# Patient Record
Sex: FEMALE | Race: WHITE | NOT HISPANIC OR LATINO | Employment: OTHER | URBAN - METROPOLITAN AREA
[De-identification: names, ages, dates, MRNs, and addresses within clinical notes are randomized per-mention and may not be internally consistent; named-entity substitution may affect disease eponyms.]

---

## 2018-08-16 ENCOUNTER — APPOINTMENT (OUTPATIENT)
Dept: GENERAL RADIOLOGY | Facility: CLINIC | Age: 68
End: 2018-08-16
Attending: EMERGENCY MEDICINE
Payer: MEDICARE

## 2018-08-16 ENCOUNTER — HOSPITAL ENCOUNTER (EMERGENCY)
Facility: CLINIC | Age: 68
Discharge: HOME | End: 2018-08-16
Attending: EMERGENCY MEDICINE | Admitting: EMERGENCY MEDICINE
Payer: MEDICARE

## 2018-08-16 VITALS
TEMPERATURE: 97 F | SYSTOLIC BLOOD PRESSURE: 156 MMHG | OXYGEN SATURATION: 97 % | DIASTOLIC BLOOD PRESSURE: 83 MMHG | RESPIRATION RATE: 25 BRPM

## 2018-08-16 DIAGNOSIS — R42 DIZZINESS: ICD-10-CM

## 2018-08-16 DIAGNOSIS — R07.89 ATYPICAL CHEST PAIN: ICD-10-CM

## 2018-08-16 LAB
ALBUMIN SERPL-MCNC: 3.6 G/DL (ref 3.4–5)
ALP SERPL-CCNC: 51 U/L (ref 40–150)
ALT SERPL W P-5'-P-CCNC: 24 U/L (ref 0–50)
ANION GAP SERPL CALCULATED.3IONS-SCNC: 7 MMOL/L (ref 3–14)
AST SERPL W P-5'-P-CCNC: 18 U/L (ref 0–45)
BASOPHILS # BLD AUTO: 0.1 10E9/L (ref 0–0.2)
BASOPHILS NFR BLD AUTO: 0.9 %
BILIRUB SERPL-MCNC: 0.4 MG/DL (ref 0.2–1.3)
BUN SERPL-MCNC: 15 MG/DL (ref 7–30)
CALCIUM SERPL-MCNC: 8.6 MG/DL (ref 8.5–10.1)
CHLORIDE SERPL-SCNC: 111 MMOL/L (ref 94–109)
CO2 SERPL-SCNC: 26 MMOL/L (ref 20–32)
CREAT SERPL-MCNC: 0.67 MG/DL (ref 0.52–1.04)
D DIMER PPP FEU-MCNC: 0.5 UG/ML FEU (ref 0–0.5)
DIFFERENTIAL METHOD BLD: NORMAL
EOSINOPHIL # BLD AUTO: 0.1 10E9/L (ref 0–0.7)
EOSINOPHIL NFR BLD AUTO: 1.8 %
ERYTHROCYTE [DISTWIDTH] IN BLOOD BY AUTOMATED COUNT: 12.7 % (ref 10–15)
GFR SERPL CREATININE-BSD FRML MDRD: 87 ML/MIN/1.7M2
GLUCOSE SERPL-MCNC: 100 MG/DL (ref 70–99)
HCT VFR BLD AUTO: 39.9 % (ref 35–47)
HGB BLD-MCNC: 12.9 G/DL (ref 11.7–15.7)
IMM GRANULOCYTES # BLD: 0 10E9/L (ref 0–0.4)
IMM GRANULOCYTES NFR BLD: 0.2 %
INTERPRETATION ECG - MUSE: NORMAL
LIPASE SERPL-CCNC: 108 U/L (ref 73–393)
LYMPHOCYTES # BLD AUTO: 2.3 10E9/L (ref 0.8–5.3)
LYMPHOCYTES NFR BLD AUTO: 35.6 %
MCH RBC QN AUTO: 29.4 PG (ref 26.5–33)
MCHC RBC AUTO-ENTMCNC: 32.3 G/DL (ref 31.5–36.5)
MCV RBC AUTO: 91 FL (ref 78–100)
MONOCYTES # BLD AUTO: 0.5 10E9/L (ref 0–1.3)
MONOCYTES NFR BLD AUTO: 7.8 %
NEUTROPHILS # BLD AUTO: 3.5 10E9/L (ref 1.6–8.3)
NEUTROPHILS NFR BLD AUTO: 53.7 %
NRBC # BLD AUTO: 0 10*3/UL
NRBC BLD AUTO-RTO: 0 /100
PLATELET # BLD AUTO: 234 10E9/L (ref 150–450)
POTASSIUM SERPL-SCNC: 3.6 MMOL/L (ref 3.4–5.3)
PROT SERPL-MCNC: 6.6 G/DL (ref 6.8–8.8)
RBC # BLD AUTO: 4.39 10E12/L (ref 3.8–5.2)
SODIUM SERPL-SCNC: 144 MMOL/L (ref 133–144)
TROPONIN I SERPL-MCNC: <0.015 UG/L (ref 0–0.04)
WBC # BLD AUTO: 6.6 10E9/L (ref 4–11)

## 2018-08-16 PROCEDURE — 84484 ASSAY OF TROPONIN QUANT: CPT | Performed by: EMERGENCY MEDICINE

## 2018-08-16 PROCEDURE — 99285 EMERGENCY DEPT VISIT HI MDM: CPT | Mod: 25

## 2018-08-16 PROCEDURE — 25000128 H RX IP 250 OP 636: Performed by: EMERGENCY MEDICINE

## 2018-08-16 PROCEDURE — 96361 HYDRATE IV INFUSION ADD-ON: CPT

## 2018-08-16 PROCEDURE — 93005 ELECTROCARDIOGRAM TRACING: CPT

## 2018-08-16 PROCEDURE — 96360 HYDRATION IV INFUSION INIT: CPT

## 2018-08-16 PROCEDURE — 80053 COMPREHEN METABOLIC PANEL: CPT | Performed by: EMERGENCY MEDICINE

## 2018-08-16 PROCEDURE — 25000131 ZZH RX MED GY IP 250 OP 636 PS 637: Mod: GY | Performed by: EMERGENCY MEDICINE

## 2018-08-16 PROCEDURE — A9270 NON-COVERED ITEM OR SERVICE: HCPCS | Mod: GY | Performed by: EMERGENCY MEDICINE

## 2018-08-16 PROCEDURE — 85379 FIBRIN DEGRADATION QUANT: CPT | Performed by: EMERGENCY MEDICINE

## 2018-08-16 PROCEDURE — 71046 X-RAY EXAM CHEST 2 VIEWS: CPT

## 2018-08-16 PROCEDURE — 83690 ASSAY OF LIPASE: CPT | Performed by: EMERGENCY MEDICINE

## 2018-08-16 PROCEDURE — 85025 COMPLETE CBC W/AUTO DIFF WBC: CPT | Performed by: EMERGENCY MEDICINE

## 2018-08-16 RX ORDER — MECLIZINE HYDROCHLORIDE 25 MG/1
25 TABLET ORAL ONCE
Status: COMPLETED | OUTPATIENT
Start: 2018-08-16 | End: 2018-08-16

## 2018-08-16 RX ADMIN — SODIUM CHLORIDE, POTASSIUM CHLORIDE, SODIUM LACTATE AND CALCIUM CHLORIDE 500 ML: 600; 310; 30; 20 INJECTION, SOLUTION INTRAVENOUS at 07:38

## 2018-08-16 RX ADMIN — MECLIZINE HYDROCHLORIDE 25 MG: 25 TABLET ORAL at 07:38

## 2018-08-16 ASSESSMENT — ENCOUNTER SYMPTOMS
FEVER: 0
CHEST TIGHTNESS: 1
VOMITING: 0
COLOR CHANGE: 0
COUGH: 0
NAUSEA: 1
CONSTIPATION: 1

## 2018-08-16 NOTE — ED AVS SNAPSHOT
[facility] Emergency Department    [address]    [facility] [address]    Phone:  [phone]    Fax:  [phone]                                       Mariajose Shepard   MRN: [id number]    Department:  [facility] Emergency Department   Date of Visit:  8/16/2018           Patient Information     Date Of Birth          1950        Your diagnoses for this visit were:     Atypical chest pain     Dizziness        You were seen by Bc Foster MD.        Discharge Instructions       Please make an appointment to follow up with your primary care provider in 1-2 days for a recheck.    Discharge Instructions  Chest Pain    You have been seen today for chest pain or discomfort.  At this time, your provider has found no signs that your chest pain is due to a serious or life-threatening condition, (or you have declined more testing and/or admission to the hospital). However, sometimes there is a serious problem that does not show up right away. Your evaluation today may not be complete and you may need further testing and evaluation.     Generally, every Emergency Department visit should have a follow-up clinic visit with either a primary or a specialty clinic/provider. Please follow-up as instructed by your emergency provider today.  Return to the Emergency Department if:    Your chest pain changes, gets worse, starts to happen more often, or comes with less activity.    You are newly short of breath.    You get very weak or tired.    You pass out or faint.    You have any new symptoms, like fever, cough, numb legs, or you cough up blood.    You have anything else that worries you.    Until you follow-up with your regular provider, please do the following:    Take one aspirin daily unless you have an allergy or are told not to by your provider.    If a stress test appointment has been made, go to the appointment.    If you have questions, contact your regular  provider.    Follow-up with your regular provider/clinic as directed; this is very important.    If you were given a prescription for medicine here today, be sure to read all of the information (including the package insert) that comes with your prescription.  This will include important information about the medicine, its side effects, and any warnings that you need to know about.  The pharmacist who fills the prescription can provide more information and answer questions you may have about the medicine.  If you have questions or concerns that the pharmacist cannot address, please call or return to the Emergency Department.       Remember that you can always come back to the Emergency Department if you are not able to see your regular provider in the amount of time listed above, if you get any new symptoms, or if there is anything that worries you.        Dizziness   Dizziness is a common symptom. It may be described as lightheadedness, spinning, or feeling like you are going to faint. Dizziness can have many causes.  Be sure to tell the healthcare provider about:    All medicines you take, including prescription, over-the-counter, herbs, and supplements    Any other symptoms you have    Any health problems you are being treated for    Any past major health problems you've had, such as a heart attack, balance issues, hearing problems, or blood pressure problems    Anything that causes the dizziness to get worse or better  Today's exam did not show an exact cause for your dizziness. Other tests may be needed. Follow up with your healthcare provider.  Home care    Dizziness that occurs with sudden standing may be a sign of mild dehydration. Drink extra fluids for the next few days.    If you recently started a new medicine, stopped a medicine, or had the dose of a current medicine changed, talk with the prescribing healthcare provider. Your medicine plan may need adjustment.    If dizziness lasts more than a few  seconds, sit or lie down until it passes. This may help prevent injury in case you pass out. Get up slowly when you feel better.    Don't drive or use power tools or dangerous equipment until you have had no dizziness for at least 48 hours.  Follow-up care  Follow up with your healthcare provider for further evaluation within the next 7 days or as advised.  When to seek medical advice  Call your healthcare provider for any of the following:    Worsening of symptoms or new symptoms    Passing out or seizure    Repeated vomiting    Headache    Palpitations (the sense that your heart is fluttering or beating fast or hard)    Shortness of breath    Blood in vomit or stool (black or red color)    Weakness of an arm or leg or 1 side of the face    Vision or hearing changes    Trouble walking or speaking    Chest, arm, neck, back, or jaw pain  Date Last Reviewed: 11/1/2017 2000-2017 The "[employer]. [address] [place] [address]. All rights reserved. This information is not intended as a substitute for professional medical care. Always follow your healthcare professional's instructions.            24 Hour Appointment Hotline       To make an appointment at any [facility], call [phone] ([phone]). If you don't have a family doctor or clinic, we will help you find one. [facility] clinics are conveniently located to serve the needs of you and your family.             Review of your medicines      Our records show that you are taking the medicines listed below. If these are incorrect, please call your family doctor or clinic.        Dose / Directions Last dose taken    CALCIUM CITRATE PO        None Entered   Refills:  0        FLUTICASONE PROPIONATE (NASAL) 50 MCG/ACT Susp        None Entered   Refills:  0        SINGULAIR PO        1 TABLET EVERY EVENING   Refills:  0        VALTREX 1000 mg tablet   Quantity:  21   Generic drug:  valACYclovir        1 TABLET 3 TIMES DAILY   Refills:   0                Procedures and tests performed during your visit     CBC with platelets differential    Comprehensive metabolic panel    D dimer quantitative    Lipase    Troponin I    XR Chest 2 Views      Orders Needing Specimen Collection     None      Pending Results     No orders found from 8/14/2018 to 8/17/2018.            Pending Culture Results     No orders found from 8/14/2018 to 8/17/2018.            Pending Results Instructions     If you had any lab results that were not finalized at the time of your Discharge, you can call the ED Lab Result RN at [phone]. You will be contacted by this team for any positive Lab results or changes in treatment. The nurses are available 7 days a week from 10A to 6:30P.  You can leave a message 24 hours per day and they will return your call.        Test Results From Your Hospital Stay        8/16/2018  7:41 AM      Component Results     Component Value Ref Range & Units Status    WBC 6.6 4.0 - 11.0 10e9/L Final    RBC Count 4.39 3.8 - 5.2 10e12/L Final    Hemoglobin 12.9 11.7 - 15.7 g/dL Final    Hematocrit 39.9 35.0 - 47.0 % Final    MCV 91 78 - 100 fl Final    MCH 29.4 26.5 - 33.0 pg Final    MCHC 32.3 31.5 - 36.5 g/dL Final    RDW 12.7 10.0 - 15.0 % Final    Platelet Count 234 150 - 450 10e9/L Final    Diff Method Automated Method  Final    % Neutrophils 53.7 % Final    % Lymphocytes 35.6 % Final    % Monocytes 7.8 % Final    % Eosinophils 1.8 % Final    % Basophils 0.9 % Final    % Immature Granulocytes 0.2 % Final    Nucleated RBCs 0 0 /100 Final    Absolute Neutrophil 3.5 1.6 - 8.3 10e9/L Final    Absolute Lymphocytes 2.3 0.8 - 5.3 10e9/L Final    Absolute Monocytes 0.5 0.0 - 1.3 10e9/L Final    Absolute Eosinophils 0.1 0.0 - 0.7 10e9/L Final    Absolute Basophils 0.1 0.0 - 0.2 10e9/L Final    Abs Immature Granulocytes 0.0 0 - 0.4 10e9/L Final    Absolute Nucleated RBC 0.0  Final         8/16/2018  8:35 AM      Component Results     Component Value Ref Range  & Units Status    D Dimer 0.5 0.0 - 0.50 ug/ml FEU Final    This D-dimer assay is intended for use in conjunction with a clinical pretest   probability assessment model to exclude pulmonary embolism (PE) and deep   venous thrombosis (DVT) in outpatients suspected of PE or DVT. The cut-off   value is 0.5 ug/mL FEU.           8/16/2018  8:03 AM      Component Results     Component Value Ref Range & Units Status    Sodium 144 133 - 144 mmol/L Final    Potassium 3.6 3.4 - 5.3 mmol/L Final    Chloride 111 (H) 94 - 109 mmol/L Final    Carbon Dioxide 26 20 - 32 mmol/L Final    Anion Gap 7 3 - 14 mmol/L Final    Glucose 100 (H) 70 - 99 mg/dL Final    Urea Nitrogen 15 7 - 30 mg/dL Final    Creatinine 0.67 0.52 - 1.04 mg/dL Final    GFR Estimate 87 >60 mL/min/1.7m2 Final    Non  GFR Calc    GFR Estimate If Black >90 >60 mL/min/1.7m2 Final    African American GFR Calc    Calcium 8.6 8.5 - 10.1 mg/dL Final    Bilirubin Total 0.4 0.2 - 1.3 mg/dL Final    Albumin 3.6 3.4 - 5.0 g/dL Final    Protein Total 6.6 (L) 6.8 - 8.8 g/dL Final    Alkaline Phosphatase 51 40 - 150 U/L Final    ALT 24 0 - 50 U/L Final    AST 18 0 - 45 U/L Final         8/16/2018  8:03 AM      Component Results     Component Value Ref Range & Units Status    Lipase 108 73 - 393 U/L Final         8/16/2018  8:03 AM      Component Results     Component Value Ref Range & Units Status    Troponin I ES <0.015 0.000 - 0.045 ug/L Final    The 99th percentile for upper reference range is 0.045 ug/L.  Troponin values   in the range of 0.045 - 0.120 ug/L may be associated with risks of adverse   clinical events.           8/16/2018  9:07 AM      Narrative     XR CHEST 2 VW 8/16/2018 9:00 AM    HISTORY: Chest pain.    COMPARISON: None.    FINDINGS: No airspace consolidation, pleural effusion or pneumothorax.  Normal heart size.        Impression     IMPRESSION: No acute cardiopulmonary abnormality.    MELITA SALINAS MD                Clinical Quality  Measure: Blood Pressure Screening     Your blood pressure was checked while you were in the emergency department today. The last reading we obtained was  BP: 156/83 . Please read the guidelines below about what these numbers mean and what you should do about them.  If your systolic blood pressure (the top number) is less than 120 and your diastolic blood pressure (the bottom number) is less than 80, then your blood pressure is normal. There is nothing more that you need to do about it.  If your systolic blood pressure (the top number) is 120-139 or your diastolic blood pressure (the bottom number) is 80-89, your blood pressure may be higher than it should be. You should have your blood pressure rechecked within a year by a primary care provider.  If your systolic blood pressure (the top number) is 140 or greater or your diastolic blood pressure (the bottom number) is 90 or greater, you may have high blood pressure. High blood pressure is treatable, but if left untreated over time it can put you at risk for heart attack, stroke, or kidney failure. You should have your blood pressure rechecked by a primary care provider within the next 4 weeks.  If your provider in the emergency department today gave you specific instructions to follow-up with your doctor or provider even sooner than that, you should follow that instruction and not wait for up to 4 weeks for your follow-up visit.        Thank you for choosing [facility]       Thank you for choosing [facility] for your care. Our goal is always to provide you with excellent care. Hearing back from our patients is one way we can continue to improve our services. Please take a few minutes to complete the written survey that you may receive in the mail after you visit with us. Thank you!        [employer] Information     [employer] lets you send messages to your doctor, view your test results, renew your prescriptions, schedule appointments and more. To sign up, go to  [email or web address] . Click on \"Log in\" on the left side of the screen, which will take you to the Welcome page. Then click on \"Sign up Now\" on the right side of the page.     You will be asked to enter the access code listed below, as well as some personal information. Please follow the directions to create your username and password.     Your access code is: [email or web address]  Expires: 2018  9:13 AM     Your access code will  in 90 days. If you need help or a new code, please call your [facility] clinic or [phone].        Care EveryWhere ID     This is your Care EveryWhere ID. This could be used by other organizations to access your [facility] medical records  [id number]        Equal Access to Services     FAITH RAINEY : Celina Clarke, tiffany hutson, alan mclain, jaydon ramos. So [facility] [phone].    ATENCIÓN: Si habla español, tiene a dupont disposición servicios gratuitos de asistencia lingüística. Llame al [phone].    We comply with applicable federal civil rights laws and [place] laws. We do not discriminate on the basis of race, color, national origin, age, disability, sex, sexual orientation, or gender identity.            After Visit Summary       This is your record. Keep this with you and show to your community pharmacist(s) and doctor(s) at your next visit.                  "

## 2018-08-16 NOTE — ED TRIAGE NOTES
"Patient presents to ED due to multiple c/o states pain to R side of chest describing it as a \"band like senation radiating to R breast to back{\" since yesterday    Woke up this morning and sat up felt dizzy, nauseated    Denies SOB     Reports taking to 81mg ( x2) ASA     Given 4mg zofran en route     "

## 2018-08-16 NOTE — DISCHARGE INSTRUCTIONS
Please make an appointment to follow up with your primary care provider in 1-2 days for a recheck.    Discharge Instructions  Chest Pain    You have been seen today for chest pain or discomfort.  At this time, your provider has found no signs that your chest pain is due to a serious or life-threatening condition, (or you have declined more testing and/or admission to the hospital). However, sometimes there is a serious problem that does not show up right away. Your evaluation today may not be complete and you may need further testing and evaluation.     Generally, every Emergency Department visit should have a follow-up clinic visit with either a primary or a specialty clinic/provider. Please follow-up as instructed by your emergency provider today.  Return to the Emergency Department if:    Your chest pain changes, gets worse, starts to happen more often, or comes with less activity.    You are newly short of breath.    You get very weak or tired.    You pass out or faint.    You have any new symptoms, like fever, cough, numb legs, or you cough up blood.    You have anything else that worries you.    Until you follow-up with your regular provider, please do the following:    Take one aspirin daily unless you have an allergy or are told not to by your provider.    If a stress test appointment has been made, go to the appointment.    If you have questions, contact your regular provider.    Follow-up with your regular provider/clinic as directed; this is very important.    If you were given a prescription for medicine here today, be sure to read all of the information (including the package insert) that comes with your prescription.  This will include important information about the medicine, its side effects, and any warnings that you need to know about.  The pharmacist who fills the prescription can provide more information and answer questions you may have about the medicine.  If you have questions or concerns that  the pharmacist cannot address, please call or return to the Emergency Department.       Remember that you can always come back to the Emergency Department if you are not able to see your regular provider in the amount of time listed above, if you get any new symptoms, or if there is anything that worries you.        Dizziness   Dizziness is a common symptom. It may be described as lightheadedness, spinning, or feeling like you are going to faint. Dizziness can have many causes.  Be sure to tell the healthcare provider about:    All medicines you take, including prescription, over-the-counter, herbs, and supplements    Any other symptoms you have    Any health problems you are being treated for    Any past major health problems you've had, such as a heart attack, balance issues, hearing problems, or blood pressure problems    Anything that causes the dizziness to get worse or better  Today's exam did not show an exact cause for your dizziness. Other tests may be needed. Follow up with your healthcare provider.  Home care    Dizziness that occurs with sudden standing may be a sign of mild dehydration. Drink extra fluids for the next few days.    If you recently started a new medicine, stopped a medicine, or had the dose of a current medicine changed, talk with the prescribing healthcare provider. Your medicine plan may need adjustment.    If dizziness lasts more than a few seconds, sit or lie down until it passes. This may help prevent injury in case you pass out. Get up slowly when you feel better.    Don't drive or use power tools or dangerous equipment until you have had no dizziness for at least 48 hours.  Follow-up care  Follow up with your healthcare provider for further evaluation within the next 7 days or as advised.  When to seek medical advice  Call your healthcare provider for any of the following:    Worsening of symptoms or new symptoms    Passing out or seizure    Repeated  vomiting    Headache    Palpitations (the sense that your heart is fluttering or beating fast or hard)    Shortness of breath    Blood in vomit or stool (black or red color)    Weakness of an arm or leg or 1 side of the face    Vision or hearing changes    Trouble walking or speaking    Chest, arm, neck, back, or jaw pain  Date Last Reviewed: 11/1/2017 2000-2017 [employer]. [address] [place] [address]. All rights reserved. This information is not intended as a substitute for professional medical care. Always follow your healthcare professional's instructions.

## 2018-08-16 NOTE — ED AVS SNAPSHOT
[facility] Emergency Department    [address]    [facility] [address]    Phone:  [phone]    Fax:  [phone]                                       Mariajose Shepard   MRN: [id number]    Department:  [facility] Emergency Department   Date of Visit:  8/16/2018           After Visit Summary Signature Page     I have received my discharge instructions, and my questions have been answered. I have discussed any challenges I see with this plan with the nurse or doctor.    ..........................................................................................................................................  Patient/Patient Representative Signature      ..........................................................................................................................................  Patient Representative Print Name and Relationship to Patient    ..................................................               ................................................  Date                                            Time    ..........................................................................................................................................  Reviewed by Signature/Title    ...................................................              ..............................................  Date                                                            Time

## 2018-08-16 NOTE — ED PROVIDER NOTES
"  History     Chief Complaint:  Chest Pain      HPI   Mariajose Shepard is a 68 year old female, with a history of vertigo, osteopenia, and heart murmer, who presents with chest pain. Patient states yesterday she spent most of the day working on bathroom revenations which involved a lot of running up. She reported when she went to bed she was feeling exhausted. This morning she woke up with chest pressure that radiates all the way across her chest and to her back. She notes this pressure has been constant and on going for a few days now. She describes it as a \"dull toothache pain\" with itching and numbness. She notes no rashes or skin changes aside from a new mole. Besides the chest pain, she also stated experiencing nausea, lightheadedness and dizziness, all symptoms she associated with her past vertigo. Patient denies any shortness of breath, fever, cough, nauea, or changes with urination and bowel movement. She notes having constipation issues which are currently being treated for her recent physical therapy.           Allergies:  No Known Drug Allergies     Medications:    Calcium citrate  Fluticasone propionate  Singulair  Valtrex      Past Medical History:    Dilatation of aortic sinus of Valsalva   Osteopenia   Heart murmur   Vertigo     Past Surgical History:    Tonsillectomy     Family History:    The patient denies any relevant family medical history.    Social History:  The patient was accompanied to the ED by  and son.  Smoking Status: No  Smokeless Tobacco: Not on file  Alcohol Use: No  Marital Status:   [2]    Review of Systems   Constitutional: Negative for fever.   Respiratory: Positive for chest tightness. Negative for cough.    Cardiovascular: Positive for chest pain.   Gastrointestinal: Positive for constipation and nausea. Negative for vomiting.   Skin: Negative for color change and rash.   All other systems reviewed and are negative.    Physical Exam   Vitals:  Patient Vitals for the " past 24 hrs:   BP Temp Temp src Heart Rate Resp SpO2   08/16/18 0830 156/83 - - 54 13 96 %   08/16/18 0815 159/77 - - 58 19 98 %   08/16/18 0800 153/77 - - 49 11 97 %   08/16/18 0745 153/79 - - 52 18 95 %   08/16/18 0730 160/76 - - 45 8 99 %   08/16/18 0715 159/82 - - 70 (!) 33 99 %   08/16/18 0700 154/84 - - 46 20 95 %   08/16/18 0646 - 97  F (36.1  C) Temporal - - -   08/16/18 0641 160/87 - - 47 20 98 %         Physical Exam  Gen: NAD  HEENT:  mmm, PERRL, no rhinorrhea  Neck: supple, no abnormal swelling or tenderness  Lungs:  CTAB,  no resp distress  CV: rrr, no m/r/g, ppi  Abd: soft, nontender, negative RUQ tenderness and negative murphys sign, nondistended, no rebound/masses/guarding/hsm  Ext: no peripheral edema  Skin: warm, dry, well perfused, no rashes/bruising/lesions on exposed skin  Neuro: alert, MAEE, no gross motor or sensory deficits, gait stable  Psych: Normal mood, normal affect     Emergency Department Course     ECG:  ECG taken at 0645, ECG read at 0704  Sinus bradycardia  Otherwise normal ECG  Rate 48 bpm. TX interval 188. QRS duration 82. QT/QTc 488/435. P-R-T axes 37, 19, 32.    Imaging:  Radiology findings were communicated with the patient who voiced understanding of the findings.  XR Chest   IMPRESSION: No acute cardiopulmonary abnormality.  Reading per radiology.    Laboratory:  Laboratory findings were communicated with the patient who voiced understanding of the findings.  CBC: AWNL (WBC 6.6, HGB 12.9, )  D Dimer quantitative: 0.5  CMP: Chloride 111 (H), Glucose 100 (H), Protein 6.6 (L) O/W WNL (Creatinine 0.67)  Lipase: 108  Troponin (Collected 0803): <0.015    Interventions:  0738 Antivert 25 mg Oral  0738 Lactated ringers BOLUS 500 mLs IV     Emergency Department Course:  Nursing notes and vitals reviewed.  I performed an exam of the patient as documented above.   IV was inserted and blood was drawn for laboratory testing, results above.  The patient was sent for a Chest XR while  in the emergency department, results above.     I personally reviewed the laboratory results with the patient and answered all related questions prior to discharge.    Findings and plan explained to the patient. Patient discharged home with instructions regarding supportive care, medications, and reasons to return. The importance of close follow-up was reviewed.     Impression & Plan      Medical Decision Making:  Mariajose Shepard is a 68-year-old female 1-2 days of right-sided chest pain.  She has also had some intermittent dizziness/vertigo.  Her neurologic examination is unremarkable here there is no signs of shingles, chest wall tenderness, right upper quadrant abdominal tenderness.  The vertigo certainly sounds positional and peripheral by her description.  I do not think this is of central etiology and she does not need advanced imaging of her brain.  The chest discomfort may be musculoskeletal or GI related low suspicion that it is hepatobiliary based on exam and story.  Early shingles would be a possibility without interruption occurring on the skin yet.  To be atypical for ACS but will do EKG troponin and d-dimer for PE or stratification.  Unlikely an aortic catastrophe spontaneous pneumothorax or pneumomediastinum.  Currently asymptomatic on ED arrival.     Update on Mariajose Shepard. She remained asymptomatic here, her troponin was undetectable and given the duration of her symptoms constant nature, nonexertional pattern this puts her in a very low risk category of it being an exclusive coronary process/ACS. D dimer not elevated. Chest XR unremarkable. Other basic blood tests unremarkable including lipase and a liver panel. And again her right upper quadrant abdominal examination showed no tenderness suggesting this is maryan related. I think at this point she can be safely discharged and have close follow up with her PCP to discuss proactive  testing. Her and her family are comfortable and agree with this plan.  They understand what is known and unknown at this time based on the tests available and don't need revaluation with her symptoms and her follow up plan.     Diagnosis:    ICD-10-CM    1. Atypical chest pain R07.89    2. Dizziness R42        Disposition:   Discharged    CMS Diagnoses: None     Scribe Disclosure:  Fawn DIAMOND, am serving as a scribe at 7:10 AM on 8/16/2018 to document services personally performed by Bc Foster, *, based on my observations and the provider's statements to me.  [facility] EMERGENCY DEPARTMENT       Bc Foster MD  08/16/18 1721

## 2019-08-06 ENCOUNTER — HOSPITAL ENCOUNTER (OUTPATIENT)
Dept: ULTRASOUND IMAGING | Facility: CLINIC | Age: 69
Discharge: HOME | End: 2019-08-06
Attending: PHYSICIAN ASSISTANT | Admitting: PHYSICIAN ASSISTANT
Payer: MEDICARE

## 2019-08-06 DIAGNOSIS — R92.8 ABNORMAL MAMMOGRAM: ICD-10-CM

## 2019-08-06 PROCEDURE — 76642 ULTRASOUND BREAST LIMITED: CPT | Mod: LT

## 2024-02-15 ENCOUNTER — HOSPITAL ENCOUNTER (OUTPATIENT)
Dept: MAMMOGRAPHY | Facility: CLINIC | Age: 74
Discharge: HOME | End: 2024-02-15
Payer: COMMERCIAL

## 2024-02-15 ENCOUNTER — MEDICAL CORRESPONDENCE (OUTPATIENT)
Dept: SCHEDULING | Facility: CLINIC | Age: 74
End: 2024-02-15

## 2024-02-15 ENCOUNTER — ANCILLARY PROCEDURE (OUTPATIENT)
Dept: GENERAL RADIOLOGY | Facility: CLINIC | Age: 74
End: 2024-02-15
Payer: COMMERCIAL

## 2024-02-15 DIAGNOSIS — M25.519 SHOULDER PAIN: ICD-10-CM

## 2024-02-15 DIAGNOSIS — Z12.31 VISIT FOR SCREENING MAMMOGRAM: ICD-10-CM

## 2024-02-15 PROCEDURE — 77063 BREAST TOMOSYNTHESIS BI: CPT

## 2024-02-15 PROCEDURE — 73030 X-RAY EXAM OF SHOULDER: CPT | Mod: TC | Performed by: RADIOLOGY

## 2024-03-28 ENCOUNTER — ANCILLARY PROCEDURE (OUTPATIENT)
Dept: BONE DENSITY | Facility: CLINIC | Age: 74
End: 2024-03-28
Payer: COMMERCIAL

## 2024-03-28 DIAGNOSIS — Z13.820 OSTEOPOROSIS SCREENING: ICD-10-CM

## 2024-03-28 DIAGNOSIS — E28.39 ESTROGEN DEFICIENCY: ICD-10-CM

## 2024-03-28 DIAGNOSIS — M85.80 OSTEOPENIA: ICD-10-CM

## 2024-03-28 PROCEDURE — 77080 DXA BONE DENSITY AXIAL: CPT | Mod: TC | Performed by: PHYSICIAN ASSISTANT

## 2024-06-22 ENCOUNTER — HOSPITAL ENCOUNTER (EMERGENCY)
Facility: CLINIC | Age: 74
Discharge: HOME | End: 2024-06-22
Attending: EMERGENCY MEDICINE | Admitting: EMERGENCY MEDICINE
Payer: COMMERCIAL

## 2024-06-22 ENCOUNTER — APPOINTMENT (OUTPATIENT)
Dept: ULTRASOUND IMAGING | Facility: CLINIC | Age: 74
End: 2024-06-22
Attending: EMERGENCY MEDICINE
Payer: COMMERCIAL

## 2024-06-22 VITALS
OXYGEN SATURATION: 98 % | HEIGHT: 65 IN | HEART RATE: 60 BPM | TEMPERATURE: 98.2 F | WEIGHT: 126.1 LBS | SYSTOLIC BLOOD PRESSURE: 157 MMHG | BODY MASS INDEX: 21.01 KG/M2 | RESPIRATION RATE: 20 BRPM | DIASTOLIC BLOOD PRESSURE: 83 MMHG

## 2024-06-22 DIAGNOSIS — R22.41 LOCALIZED SWELLING OF RIGHT FOOT: ICD-10-CM

## 2024-06-22 PROCEDURE — 99284 EMERGENCY DEPT VISIT MOD MDM: CPT | Mod: 25

## 2024-06-22 PROCEDURE — 93971 EXTREMITY STUDY: CPT | Mod: RT

## 2024-06-22 ASSESSMENT — COLUMBIA-SUICIDE SEVERITY RATING SCALE - C-SSRS
2. HAVE YOU ACTUALLY HAD ANY THOUGHTS OF KILLING YOURSELF IN THE PAST MONTH?: NO
1. IN THE PAST MONTH, HAVE YOU WISHED YOU WERE DEAD OR WISHED YOU COULD GO TO SLEEP AND NOT WAKE UP?: NO
6. HAVE YOU EVER DONE ANYTHING, STARTED TO DO ANYTHING, OR PREPARED TO DO ANYTHING TO END YOUR LIFE?: NO

## 2024-06-22 ASSESSMENT — ACTIVITIES OF DAILY LIVING (ADL)
ADLS_ACUITY_SCORE: 35
ADLS_ACUITY_SCORE: 35
ADLS_ACUITY_SCORE: 33

## 2024-06-22 NOTE — DISCHARGE INSTRUCTIONS
You do not have a blood clot in the leg.  Please continue taking your antibiotics and wrapping your foot.  Elevate when possible.  If you are not noticing any improvement in your symptoms by the end of this coming week, please contact your primary care provider to set up a follow-up appointment.  Please return the emergency department if you develop any new or concerning symptoms.

## 2024-06-22 NOTE — ED PROVIDER NOTES
"  Emergency Department Note      History of Present Illness     Chief Complaint  Abnormal Labs      HPI  Mariajose Shepard is a 74 year old female with a history of a heart murmur, hypertension, and osteopenia, who presents to the ED for abnormal labs. On Tuesday, 6/11/24, the patient woke up with a soreness on the top of her R foot. This morning, she woke up and felt that her R foot was throbbing, and a dull pain had radiated to her R calf and thigh. Resting makes the pain better, such as when she wakes up in the morning, and movement worsens the pain. She states her foot is also slightly numb, but thinks that is from having it wrapped. She went to Urgent Care today, and when blood work showed she had a D-Dimer of 1.91, they referred her to the ED. She was diagnosed with cellulitis, and given antibiotics. The patient denies any shortness of breath, chest pain, or fever. She has no known recent traumas to the R foot or leg. Of note, the patient states that she is always cold due to being anemic.     Independent Historian  Son present at bedside and corroborates the above.     Review of External Notes  I reviewed the Urgent Care note from today, 6/22/24.   Past Medical History   Medical History and Problem List  Anemia   Osteopenia   Heart murmur   Arthritis  Hypertension     Medications  Valtrex  Losartan  Cefadroxil  Alendronate   Meclizine     Surgical History   Tonsillectomy  EGD  Colonoscopy     Physical Exam   Patient Vitals for the past 24 hrs:   BP Temp Temp src Pulse Resp SpO2 Height Weight   06/22/24 1847 (!) 157/83 -- -- 60 20 98 % -- --   06/22/24 1655 (!) 164/83 98.2  F (36.8  C) Oral 66 20 99 % 1.651 m (5' 5\") 57.2 kg (126 lb 1.7 oz)     Physical Exam  General: No acute distress.  Head: No obvious trauma to head.  Eyes:  Conjunctivae clear.   Neck: Normal range of motion.   CV: Skin is well perfused, no cyanosis  Respiratory: Effort normal. No audible wheezing.  Gastrointestinal: " Patient Name:  Ruth Dominguez   YOB: 1984   MRN:  [id number]     Group Topic:  BH Group Psychotherapy  Group Date:  2/8/2022  Start Time:   6:00 PM  End Time:   8:00 PM  Facilitators:  Maury Cannon PSYD   Department::  [facility]        [facility]      Date: 02/08/22    Off-site:  No    Activities: Review of Homework from Week 2, review of Concepts from Week 2, presentation of Topics in Week 3    Data and Progress toward Goal(s): This is the third of seven meetings in this module, with 3 parents (representing 3 children) attending the Parenting Skills Group.  The topics covered in this evening's group included: Reviewing use of effective attending skills to increase immediate compliance; assisting parents in increasing the use of effective attending skills for independent, non-disruptive compliance; and increasing parental monitoring of children's appropriate behavior.      Plan:  Continue to support patient's efforts and progress towards establishing treatment plan goals in the following ways: Check in with parent regarding compliance with this week's homework and application of new concepts.  The next group meeting will be next Tuesday at 6pm.       This visit is being performed virtually.  Clinician Location: Office.   's Location: Home   120 minutes were spent with the patient in a video encounter.        Patient’s response to today’s intervention:  Patient was engaged and cooperative during group session. She seemed to struggle how to put into practice effective commands, often providing examples of requests as questions. Was receptive to recommendations given.     The encounter diagnosis was Generalized anxiety disorder.       Non-distended.  Musculoskeletal: Normal range of motion. No gross deformities. No lower extremity edema. DP and PT 2+ bilaterally. Capillary refill in the toes are less than 2 seconds.   Neuro: Alert. Moving all extremities appropriately.  Normal speech. Foot sensation grossly intact. Planterflexion and dorsiflexion strength 5/5 bilaterally.    Skin: No rashes or lesions on exposed skin.    Diagnostics   Lab Results   Labs Ordered and Resulted from Time of ED Arrival to Time of ED Departure - No data to display    Imaging  US Lower Extremity Venous Duplex Right   Final Result   IMPRESSION:   1.  No deep venous thrombosis in the right lower extremity.        Independent Interpretation  None  ED Course    Medications Administered  Medications - No data to display    Procedures  Procedures     Discussion of Management  None    Social Determinants of Health adding to complexity of care  None    ED Course  ED Course as of 06/23/24 0027   Sat Jun 22, 202422, 2024 1717 I obtained the history and examined the patient as above.      Medical Decision Making / Diagnosis   CMS Diagnoses: None    MIPS     None    [facility]  Mariajose Shepard is a 74 year old female presenting with an elevated D-dimer level.  She was evaluated at urgent care for swelling in the right foot.  D-dimer was obtained, and when it resulted as elevated she was instructed to present to the emergency department to evaluate for possible DVT.  She arrives with her foot in an Ace bandage wrap.  I do not appreciate swelling of the right foot.  She reports the swelling has improved after applying the Ace bandage wrap.  An ultrasound of the right lower extremity is obtained and is negative for DVT.  She is relieved by this.  She is instructed to take her medication as prescribed and to follow-up with her primary care provider if she has any continued symptoms.  Return precautions are given and she verbalizes understanding.  She is discharged home in stable condition with her  son.    Disposition  The patient was discharged.     ICD-10 Codes:    ICD-10-CM    1. Localized swelling of right foot  R22.41            Discharge Medications  Discharge Medication List as of 6/22/2024  7:15 PM            Scribe Disclosure:  I, Michelle Forrester, am serving as a scribe at 5:59 PM on 6/22/2024 to document services personally performed by Ry Rodriguez MD based on my observations and the provider's statements to me.        Ry Rodriguez MD  06/23/24 0028

## 2024-06-22 NOTE — ED TRIAGE NOTES
Pt went to [facility] today. Over the last 11 days, Pt has had an ache in her right foot. Pt was diagnosed with cellulitis, and given ABX. Pt was then called by  and advised to come to ER for an D-Dimer of 1.91.     Pt denies chest pain, SOB.     Pt does have her foot in a wrap from . Is stating her foot is dull.      Triage Assessment (Adult)       Row Name 06/22/24 7960          Triage Assessment    Airway WDL WDL        Respiratory WDL    Respiratory WDL WDL        Cardiac WDL    Cardiac WDL WDL        Cognitive/Neuro/Behavioral WDL    Cognitive/Neuro/Behavioral WDL WDL

## 2025-05-01 ENCOUNTER — HOSPITAL ENCOUNTER (OUTPATIENT)
Dept: MAMMOGRAPHY | Facility: CLINIC | Age: 75
Discharge: HOME | End: 2025-05-01
Attending: FAMILY MEDICINE
Payer: COMMERCIAL

## 2025-05-01 DIAGNOSIS — Z12.31 VISIT FOR SCREENING MAMMOGRAM: ICD-10-CM

## 2025-05-01 PROCEDURE — 77063 BREAST TOMOSYNTHESIS BI: CPT
